# Patient Record
(demographics unavailable — no encounter records)

---

## 2024-12-05 NOTE — HISTORY OF PRESENT ILLNESS
[FreeTextEntry1] :  Patient here for periodic assessment and blood work. [de-identified] :  Patient here for periodic assessment and blood work.

## 2024-12-05 NOTE — HISTORY OF PRESENT ILLNESS
[FreeTextEntry1] :  Patient here for periodic assessment and blood work. [de-identified] :  Patient here for periodic assessment and blood work.

## 2025-01-31 NOTE — HISTORY OF PRESENT ILLNESS
[FreeTextEntry1] :   DANIELLA KEARNS Oct 28 1953   Language: English Date of First visit: 01/28/2025 Accompanied by: self Contact info: Referring Provider/PCP: Dr. Menon  Fax:   CC/ Problem List: elevated PSA  kidney stones  =============================================================================== FIRST VISIT / Summary: Very pleasant 71 year old M here for elevated PSA. No family hx of prostate cancer or  malignancies. Very minimal urinary symptoms of weak stream very seldomly otherwise content with voiding pattern  Kidneys stones: independently passed 5mm stone 2015  ------------------------------------------------------------------------------------------- INTERVAL VISITS:   ===============================================================================   PMH: AF on Eliquis   FHx: no  malignancies  SocHx: non smoker, social Etoh, , 2 children, retired   PSH: lithotripsy shockwave 2014, L wrist 2003 and L knee at 19    ROS: Review of Systems is as per HPI unless otherwise denoted below   =============================================================================== DATA: LABS (SELECTED):--------------------------------------------------------------------------------------------------- 12/5/2024 PSA 5.28, UA-blood, leukocyte, nitrite ALL negative, Cr 1.19, A1C 5.8   RADS:-------------------------------------------------------------------------------------------------------------------  2/20/2024 US Renal- normal renal US    PATHOLOGY/CYTOLOGY:-------------------------------------------------------------------------------------------     VOIDING STUDIES: ----------------------------------------------------------------------------------------------------     STONE STUDIES: (Analysis/LLSA)----------------------------------------------------------------------------------     PROCEDURES: -----------------------------------------------------------------------------------------------       =============================================================================== PHYSICAL EXAM:    FOCUSED: ----------------------------------------------------------------------------------------------------------------     ======================================================================================= DISCUSSION: ======================================================================================= ASSESSMENT and PLAN   1. elevated PSA -UCx, repeat PSA -prostate MRI then biopsy pending MRI result  -grand jury beginning Feb 14 2025 minimum 21 days  -f/u in March 2025    =======================================================================================   Thank you for allowing me to assist in the care of your patient. Should you have any questions please do not hesitate to reach out to me.     Derek Fairchild MD                                                    [facility] Physician [facility] [place] [facility]   [place] Office: [address], [address] [place] [address] T: [phone] F: [phone]   [place] Office: [address], [address] [place] [address] T: [phone] F: [phone]

## 2025-01-31 NOTE — HISTORY OF PRESENT ILLNESS
[FreeTextEntry1] :   DANIELLA KEARNS Oct 28 1953   Language: English Date of First visit: 01/28/2025 Accompanied by: self Contact info: Referring Provider/PCP: Dr. Menon  Fax:   CC/ Problem List: elevated PSA  kidney stones  =============================================================================== FIRST VISIT / Summary: Very pleasant 71 year old M here for elevated PSA. No family hx of prostate cancer or  malignancies. Very minimal urinary symptoms of weak stream very seldomly otherwise content with voiding pattern  Kidneys stones: independently passed 5mm stone 2015  ------------------------------------------------------------------------------------------- INTERVAL VISITS:   ===============================================================================   PMH: AF on Eliquis   FHx: no  malignancies  SocHx: non smoker, social Etoh, , 2 children, retired   PSH: lithotripsy shockwave 2014, L wrist 2003 and L knee at 19    ROS: Review of Systems is as per HPI unless otherwise denoted below   =============================================================================== DATA: LABS (SELECTED):--------------------------------------------------------------------------------------------------- 12/5/2024 PSA 5.28, UA-blood, leukocyte, nitrite ALL negative, Cr 1.19, A1C 5.8   RADS:-------------------------------------------------------------------------------------------------------------------  2/20/2024 US Renal- normal renal US    PATHOLOGY/CYTOLOGY:-------------------------------------------------------------------------------------------     VOIDING STUDIES: ----------------------------------------------------------------------------------------------------     STONE STUDIES: (Analysis/LLSA)----------------------------------------------------------------------------------     PROCEDURES: -----------------------------------------------------------------------------------------------       =============================================================================== PHYSICAL EXAM:    FOCUSED: ----------------------------------------------------------------------------------------------------------------     ======================================================================================= DISCUSSION: ======================================================================================= ASSESSMENT and PLAN   1. elevated PSA -UCx, repeat PSA -prostate MRI then biopsy pending MRI result  -grand jury beginning Feb 14 2025 minimum 21 days  -f/u in March 2025    =======================================================================================   Thank you for allowing me to assist in the care of your patient. Should you have any questions please do not hesitate to reach out to me.     Derek Fairchild MD                                                    [facility] Physician [facility]   [place] Office: [address], [address] [place] [address] T: [phone] F: [phone]   [place] Office: [address], [address] [place] [address] T: [phone] F: [phone]

## 2025-03-12 NOTE — HEALTH RISK ASSESSMENT
[Excellent] : ~his/her~  mood as  excellent [Never (0 pts)] : Never (0 points) [No] : In the past 12 months have you used drugs other than those required for medical reasons? No [No falls in past year] : Patient reported no falls in the past year [Little interest or pleasure doing things] : 1) Little interest or pleasure doing things [Feeling down, depressed, or hopeless] : 2) Feeling down, depressed, or hopeless [0] : 2) Feeling down, depressed, or hopeless: Not at all (0) [Time Spent: ___ Minutes] : I spent [unfilled] minutes performing a depression screening for this patient. [Yes] : takes [None] : Patient does not have any barriers to medication adherence [Never] : Never [NO] : No [Patient declined colonoscopy] : Patient declined colonoscopy [HIV test declined] : HIV test declined [Hepatitis C test declined] : Hepatitis C test declined [With Significant Other] : lives with significant other [Retired] : retired [] :  [Sexually Active] : sexually active [Feels Safe at Home] : Feels safe at home [Fully functional (bathing, dressing, toileting, transferring, walking, feeding)] : Fully functional (bathing, dressing, toileting, transferring, walking, feeding) [Fully functional (using the telephone, shopping, preparing meals, housekeeping, doing laundry, using] : Fully functional and needs no help or supervision to perform IADLs (using the telephone, shopping, preparing meals, housekeeping, doing laundry, using transportation, managing medications and managing finances) [Reports normal functional visual acuity (ie: able to read med bottle)] : Reports normal functional visual acuity [Change in mental status noted] : No change in mental status noted [Language] : denies difficulty with language [Behavior] : denies difficulty with behavior [Learning/Retaining New Information] : denies difficulty learning/retaining new information [Handling Complex Tasks] : denies difficulty handling complex tasks [Reasoning] : denies difficulty with reasoning [Spatial Ability and Orientation] : denies difficulty with spatial ability and orientation [High Risk Behavior] : no high risk behavior [Reports changes in hearing] : Reports no changes in hearing [Reports changes in vision] : Reports no changes in vision

## 2025-03-12 NOTE — HISTORY OF PRESENT ILLNESS
[FreeTextEntry1] :  patient here for yearly physical exam [de-identified] :  patient here for yearly physical exam

## 2025-03-19 NOTE — HISTORY OF PRESENT ILLNESS
[FreeTextEntry1] : DANIELLA KEARNS Oct 28 1953  Language: English Date of First visit: 01/28/2025 Accompanied by: self Contact info: Referring Provider/PCP: Dr. Menon Fax: tw  CC/ Problem List: elevated PSA kidney stones =============================================================================== FIRST VISIT / Summary: Very pleasant 71 year old M here for elevated PSA. No family hx of prostate cancer or  malignancies. Very minimal urinary symptoms of weak stream very seldomly otherwise content with voiding pattern  Kidneys stones: independently passed 5mm stone 2015 ------------------------------------------------------------------------------------------- INTERVAL VISITS: The patient's medications and allergies were reviewed and edited below. Dated 03/19/2025.  Here for 2mo follow-up. MRI no auth needed. Report not in chart. He tried to schedule it and has not had answer regarding his hardware in left wrist and left leg that are probably both stainless steel  ===============================================================================  PMH: AF on Eliquis  FHx: no  malignancies SocHx: non smoker, social Etoh, , 2 children, retired  PSH: lithotripsy shockwave 2014, L wrist 2003 and L knee at 19  ROS: Review of Systems is as per HPI unless otherwise denoted below  =============================================================================== DATA: LABS (SELECTED):--------------------------------------------------------------------------------------------------- 12/5/2024 PSA 5.28, UA-blood, leukocyte, nitrite ALL negative, Cr 1.19, A1C 5.8 01/28/2025 PSA 5.03 3/12/25: PSA 4.68  RADS:------------------------------------------------------------------------------------------------------------------- 2/20/2024 US Renal- normal renal US  PATHOLOGY/CYTOLOGY:-------------------------------------------------------------------------------------------   VOIDING STUDIES: ----------------------------------------------------------------------------------------------------   STONE STUDIES: (Analysis/LLSA)----------------------------------------------------------------------------------   PROCEDURES: -----------------------------------------------------------------------------------------------    =============================================================================== PHYSICAL EXAM:   FOCUSED: ----------------------------------------------------------------------------------------------------------------   ======================================================================================= DISCUSSION: ======================================================================================= ASSESSMENT and PLAN  1. elevated PSA -UCx, repeat PSA -prostate MRI then biopsy pending MRI result- he will call regarding [facility]andrak[facility] Cope [facility] [place] beginning Feb 14 2025 minimum 21 days -f/u in March 2025   ======================================================================================= The total time personally spent preparing for this visit (reviewing test results, obtaining external history) and during the visit (ordering tests/medications, spent face to face with the patient / family and counseling them on the above), as well as after the visit (on clinical documentation and coordination with other care providers) was approximately 35 minutes in addition to any procedures.  Thank you for allowing me to assist in the care of your patient. Should you have any questions please do not hesitate to reach out to me.   Derek Fairchild MD       [facility] Physician [facility]  [place] Office: [address], [address] [place] [address] T: [phone] F: [phone]  [place] Office: [address], [address]st [place] [address] T: [phone] F: [phone]

## 2025-06-25 NOTE — HISTORY OF PRESENT ILLNESS
[FreeTextEntry1] :  Patient here for periodic assessment and blood work.  [de-identified] :  Patient here for periodic assessment and blood work.